# Patient Record
Sex: MALE | Race: WHITE | NOT HISPANIC OR LATINO | URBAN - METROPOLITAN AREA
[De-identification: names, ages, dates, MRNs, and addresses within clinical notes are randomized per-mention and may not be internally consistent; named-entity substitution may affect disease eponyms.]

---

## 2020-06-04 ENCOUNTER — EMERGENCY (EMERGENCY)
Facility: HOSPITAL | Age: 40
LOS: 1 days | Discharge: HOME | End: 2020-06-04
Attending: EMERGENCY MEDICINE | Admitting: EMERGENCY MEDICINE
Payer: OTHER MISCELLANEOUS

## 2020-06-04 VITALS
OXYGEN SATURATION: 96 % | RESPIRATION RATE: 16 BRPM | SYSTOLIC BLOOD PRESSURE: 126 MMHG | TEMPERATURE: 98 F | HEART RATE: 80 BPM | DIASTOLIC BLOOD PRESSURE: 86 MMHG

## 2020-06-04 PROCEDURE — 99283 EMERGENCY DEPT VISIT LOW MDM: CPT

## 2020-06-04 RX ORDER — KETOROLAC TROMETHAMINE 30 MG/ML
30 SYRINGE (ML) INJECTION ONCE
Refills: 0 | Status: DISCONTINUED | OUTPATIENT
Start: 2020-06-04 | End: 2020-06-04

## 2020-06-04 RX ORDER — DIAZEPAM 5 MG
2 TABLET ORAL ONCE
Refills: 0 | Status: DISCONTINUED | OUTPATIENT
Start: 2020-06-04 | End: 2020-06-04

## 2020-06-04 RX ORDER — OXYCODONE AND ACETAMINOPHEN 5; 325 MG/1; MG/1
1 TABLET ORAL ONCE
Refills: 0 | Status: DISCONTINUED | OUTPATIENT
Start: 2020-06-04 | End: 2020-06-04

## 2020-06-04 RX ORDER — ACETAMINOPHEN 500 MG
650 TABLET ORAL ONCE
Refills: 0 | Status: DISCONTINUED | OUTPATIENT
Start: 2020-06-04 | End: 2020-06-04

## 2020-06-04 RX ORDER — LIDOCAINE 4 G/100G
1 CREAM TOPICAL ONCE
Refills: 0 | Status: COMPLETED | OUTPATIENT
Start: 2020-06-04 | End: 2020-06-04

## 2020-06-04 RX ADMIN — LIDOCAINE 1 PATCH: 4 CREAM TOPICAL at 21:38

## 2020-06-04 RX ADMIN — Medication 30 MILLIGRAM(S): at 21:40

## 2020-06-04 RX ADMIN — Medication 2 MILLIGRAM(S): at 21:37

## 2020-06-04 RX ADMIN — OXYCODONE AND ACETAMINOPHEN 1 TABLET(S): 5; 325 TABLET ORAL at 23:31

## 2020-06-04 NOTE — ED PROVIDER NOTE - ATTENDING CONTRIBUTION TO CARE
I performed a history and physical exam of the patient and discussed their management with the resident and /or advanced care provider. I reviewed the resident and /or ACP's note and agree with the documented findings and plan of care. My medical decison making and observations are found above.    jaileneasael: 40 yo man with mechanical explanation for his lower back pain.  pt with tenderenss right ls spine, normal neuro exam including perirectal sensation.  will medicate and dc home with instructions.  should consider going to PCP for PT I performed a history and physical exam of the patient and discussed their management with the resident and /or advanced care provider. I reviewed the resident and /or ACP's note and agree with the documented findings and plan of care. My medical decision making and observations are found above.    humera: 40 yo man with mechanical explanation for his lower back pain.  pt with tenderenss right ls spine, normal neuro exam including perirectal sensation.  will medicate and dc home with instructions.  should consider going to PCP for PT. I performed a history and physical exam of the patient and discussed their management with the resident and /or advanced care provider. I reviewed the resident and /or ACP's note and agree with the documented findings and plan of care. My medical decision making and observations are found above.    jaileneasael: 38 yo man with mechanical explanation for his lower back pain.  pt with tenderenss right ls spine, normal neuro exam including perirectal sensation.  will medicate and dc home with instructions.  should consider going to PCP for PT.. I performed a history and physical exam of the patient and discussed their management with the resident and /or advanced care provider. I reviewed the resident and /or ACP's note and agree with the documented findings and plan of care. My medical decision making and observations are found above.    jaileneasael: 40 yo man with mechanical explanation for his lower back pain.  pt with tenderenss right ls spine, normal neuro exam including perirectal sensation.  will medicate and dc home with instructions.  should consider going to PCP for PT...

## 2020-06-04 NOTE — ED ADULT NURSE NOTE - CHIEF COMPLAINT QUOTE
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 Pt calm pleasant affect.

## 2020-06-04 NOTE — ED PROVIDER NOTE - CLINICAL SUMMARY MEDICAL DECISION MAKING FREE TEXT BOX
patient presenting with right sided lower back pain. no focal deficits appreciated. plan for analgesic and reassessment patient presenting with right sided lower back pain. no focal deficits appreciated. plan for analgesic and reassessment    haughey: 38 yo man with mechanical explanation for his lower back pain.  pt with tenderenss right ls spine, normal neuro exam including perirectal sensation.  will medicate and dc home with instructions.  should consider going to PCP for PT patient presenting with right sided lower back pain. no focal deficits appreciated. plan for analgesic and reassessment    haughey: 38 yo man with mechanical explanation for his lower back pain.  pt with tenderenss right ls spine, normal neuro exam including perirectal sensation.  will medicate and dc home with instructions.  should consider going to PCP for PT.

## 2020-06-04 NOTE — ED PROVIDER NOTE - PROGRESS NOTE DETAILS
on reassessment patient reported feeling. pain improved. medication sent to pharmacy, patient advised to f/u with pcp

## 2020-06-04 NOTE — ED PROVIDER NOTE - PATIENT PORTAL LINK FT
You can access the FollowMyHealth Patient Portal offered by [facility] by registering at the following website: [email or web address]. By joining [facility]’s FollowMyHealth portal, you will also be able to view your health information using other applications (apps) compatible with our system.

## 2020-06-04 NOTE — ED PROVIDER NOTE - NEUROLOGICAL, MLM
Alert and oriented, no focal deficits, no motor or sensory deficits. Alert and oriented, no focal deficits, no motor or sensory deficits.. no saddle anesthesia. normal sphincter tone

## 2020-06-04 NOTE — ED ADULT NURSE NOTE - NSIMPLEMENTINTERV_GEN_ALL_ED
Implemented All Universal Safety Interventions:  [place] to call system. Call bell, personal items and telephone within reach. Instruct patient to call for assistance. Room bathroom lighting operational. Non-slip footwear when patient is off stretcher. Physically safe environment: no spills, clutter or unnecessary equipment. Stretcher in lowest position, wheels locked, appropriate side rails in place.

## 2020-06-04 NOTE — ED ADULT TRIAGE NOTE - CHIEF COMPLAINT QUOTE
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 Pt calm pleasant affect.

## 2020-06-04 NOTE — ED ADULT NURSE NOTE - OBJECTIVE STATEMENT
pt came to intake with c/o right lower back pain . pt is alert and oriented. seen by pa. due meds given. will continue to monitor.

## 2020-06-04 NOTE — ED PROVIDER NOTE - MUSCULOSKELETAL, MLM
back: tenderness to the right lumbosacral. No rash, obvious signs of trauma, rash. lower extremity strength Spine appears normal, range of motion is not limited, no muscle or joint tenderness

## 2020-06-05 RX ORDER — LIDOCAINE 4 G/100G
1 CREAM TOPICAL
Qty: 12 | Refills: 0
Start: 2020-06-05

## 2020-06-05 RX ORDER — IBUPROFEN 200 MG
1 TABLET ORAL
Qty: 25 | Refills: 0
Start: 2020-06-05

## 2021-12-14 NOTE — ED PROVIDER NOTE - NEURO NEGATIVE STATEMENT, MLM
14-Dec-2021 12:10
no loss of consciousness, no gait abnormality, no headache, no sensory deficits, and no weakness.

## 2024-08-08 NOTE — ED ADULT TRIAGE NOTE - NS ED NURSE BANDS TYPE
[FreeTextEntry1] : 1. cardiac risk- Calcium score 10. . Denies chest pain or SOB. ASCVD risk 6%. Advised lifestyle modifications.  2. A1c- 5.9 in 2023. Plan to repeat.  3. Repeat TTe due to mildly enlarged RV. No history of sleep apnea.  4. Follow up in 6-8 weeks with me.      [EKG obtained to assist in diagnosis and management of assessed problem(s)] : EKG obtained to assist in diagnosis and management of assessed problem(s) Name band;
